# Patient Record
(demographics unavailable — no encounter records)

---

## 2024-10-15 NOTE — HEALTH RISK ASSESSMENT

## 2024-10-15 NOTE — HISTORY OF PRESENT ILLNESS
[FreeTextEntry8] : JOHANNE BOURNE is a 41 year old male presenting to establish care.  PMH of migraines, cavernous angioma

## 2024-10-23 NOTE — REASON FOR VISIT
[Home] : at home, [unfilled] , at the time of the visit. [Medical Office: ([facility])___] : at the medical office located in  [Verbal consent obtained from patient] : the patient, [unfilled] [New Patient Visit] : a new patient visit

## 2024-10-23 NOTE — REASON FOR VISIT
[Home] : at home, [unfilled] , at the time of the visit. [Medical Office: ([facility])___] : at the medical office located in  [Verbal consent obtained from patient] : the patient, [unfilled] [New Patient Visit] : a new patient visit

## 2024-10-30 NOTE — HISTORY OF PRESENT ILLNESS
[de-identified] : JOHANNE BOURNE is a 41 year old right hand dominant male, previous patient of Dr. Zhou being followed for cavernous malformation. He previously saw Dr. Lamb years ago. It was diagnosed [id number] after presenting with migraines, work-up by neurologist at the time Dr. Ajit Dueñas [phone]. Being treated with Nurtec for migraines. Questionable history of prior hemorrhage but not evident on 2018 MRI brain or new MRI brain.  Today, patient presents to establish neurosurgical care. He underwent most recent MRI of the brain w/wo IV contrast on 10/7/24.

## 2024-10-30 NOTE — ASSESSMENT
[FreeTextEntry1] : IMPRESSION: 41M RHD, PMH of migraines on Nurtec, previous patient of Dr. Zhou being followed for cavernous malformation diagnosed 2013 after p/w migraines, unchanged over many follow up studies.   Patient presents for phone call today to establish neurosurgical care. He underwent most recent MRI of the brain w/wo IV contrast on 10/7/24 which shows stable 8 x 9 x 7mm right thalamic cavernous angioma. Counseled patient on the natural history and pathophysiology of cavernous malformations. Risk of rupture is approximately 0.5% per year. Will continue to monitor.   PLAN: Recommend continued conservative management with repeat MRI brain w/wo contrast in 1 year - October 2025 Follow up after for review No neurosurgical contraindication to Nurtec or other medication for migraines

## 2024-10-30 NOTE — HISTORY OF PRESENT ILLNESS
[de-identified] : JOHANNE BOURNE is a 41 year old right hand dominant male, previous patient of Dr. Zhou being followed for cavernous malformation. He previously saw Dr. Lamb years ago. It was diagnosed [id number] after presenting with migraines, work-up by neurologist at the time Dr. Ajit Dueñas [phone]. Being treated with Nurtec for migraines. Questionable history of prior hemorrhage but not evident on 2018 MRI brain or new MRI brain.  Today, patient presents to establish neurosurgical care. He underwent most recent MRI of the brain w/wo IV contrast on 10/7/24.

## 2024-10-30 NOTE — DATA REVIEWED
[de-identified] : MRI brain w/wo IV contrast 10/7/24, 10/3/23, 10/12/18, 5/13/15 [de-identified] : MRA Brain non con 9/22/23

## 2024-10-30 NOTE — HISTORY OF PRESENT ILLNESS
[de-identified] : JHOANNE BOURNE is a 41 year old right hand dominant male, previous patient of Dr. Zhou being followed for cavernous malformation. He previously saw Dr. Lamb years ago. It was diagnosed [id number] after presenting with migraines, work-up by neurologist at the time Dr. Ajit Dueñas [phone]. Being treated with Nurtec for migraines. Questionable history of prior hemorrhage but not evident on 2018 MRI brain or new MRI brain.  Today, patient presents to establish neurosurgical care. He underwent most recent MRI of the brain w/wo IV contrast on 10/7/24.

## 2024-10-30 NOTE — DATA REVIEWED
[de-identified] : MRI brain w/wo IV contrast 10/7/24, 10/3/23, 10/12/18, 5/13/15 [de-identified] : MRA Brain non con 9/22/23

## 2024-10-30 NOTE — DATA REVIEWED
[de-identified] : MRI brain w/wo IV contrast 10/7/24, 10/3/23, 10/12/18, 5/13/15 [de-identified] : MRA Brain non con 9/22/23

## 2025-01-07 NOTE — PHYSICAL EXAM
[General Appearance - Alert] : alert [General Appearance - In No Acute Distress] : in no acute distress [Oriented To Time, Place, And Person] : oriented to person, place, and time [Impaired Insight] : insight and judgment were intact [Affect] : the affect was normal [Person] : oriented to person [Place] : oriented to place [Time] : oriented to time [Fluency] : fluency intact [Comprehension] : comprehension intact [Past History] : adequate knowledge of personal past history [Cranial Nerves Optic (II)] : visual acuity intact bilaterally,  visual fields full to confrontation, pupils equal round and reactive to light [Cranial Nerves Oculomotor (III)] : extraocular motion intact [Cranial Nerves Trigeminal (V)] : facial sensation intact symmetrically [Cranial Nerves Facial (VII)] : face symmetrical [Cranial Nerves Vestibulocochlear (VIII)] : hearing was intact bilaterally [Cranial Nerves Glossopharyngeal (IX)] : tongue and palate midline [Cranial Nerves Accessory (XI - Cranial And Spinal)] : head turning and shoulder shrug symmetric [Cranial Nerves Hypoglossal (XII)] : there was no tongue deviation with protrusion [Motor Tone] : muscle tone was normal in all four extremities [Motor Strength] : muscle strength was normal in all four extremities [No Muscle Atrophy] : normal bulk in all four extremities [Motor Handedness Right-Handed] : the patient is right hand dominant [Sensation Tactile Decrease] : light touch was intact [Abnormal Walk] : normal gait [Balance] : balance was intact [Past-pointing] : there was no past-pointing [Tremor] : no tremor present [Dysdiadochokinesia Bilaterally] : not present [Coordination - Dysmetria Impaired Finger-to-Nose Bilateral] : not present [2+] : Ankle jerk left 2+

## 2025-01-07 NOTE — DATA REVIEWED
[de-identified] :  Results:	   MR Head w/wo IV Cont             Final  No Documents Attached    	 EXAM: [id number] - MR BRAIN WAW IC  - ORDERED BY: LUPIS VALENCIA   PROCEDURE DATE:  10/07/2024    INTERPRETATION:  CLINICAL INDICATION: RIGHT THALAMIC CAVERNOUS MALFORMATION.  TECHNIQUE: Multi-planar, multi-sequence magnetic resonance imaging of the brain was performed with and without intravenous contrast.  CONTRAST: Post-contrast MR images were obtained following infusion of 9 mL of Gadavist  COMPARISON: 10/03/2023  FINDINGS:  VENTRICLES AND SULCI:  Normal. INTRA-AXIAL:  No acute intracranial hemorrhage, midline shift or evidence of acute cerebral ischemia. :  Redemonstration of lesion with increased signal on T2/FLAIR and a hemosiderin rim in the right thalamus measuring 0.8 x 0.9 x 0.7 cm which demonstrates susceptibility artifact. No abnormal enhancement. EXTRA-AXIAL:  No mass or collection. VISUALIZED SINUSES Retention cyst or polyp in the left maxillary sinus. VISUALIZED MASTOIDS:  Clear. CALVARIUM:  Normal. CAROTID FLOW VOIDS: Normal. 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  IMPRESSION: Stable right thalamic cavernous angioma.     	 EXAM: [id number] - MR ANGIO BRAIN  - ORDERED BY: CHERELLE YEE   PROCEDURE DATE:  09/22/2023    INTERPRETATION:  CLINICAL STATEMENT: Right thalamic cavernous angioma described on MRI of the brain 10/12/2018.  TECHNIQUE: MRA the [place] was performed utilizing 3-D time-of-flight technique. MIP images provided. COMPARISON: Correlation with MRI brain 10/12/2018.  FINDINGS:  Bilateral distal vertebral arteries patent, with left-sided dominance. Fetal origin right posterior cerebral artery.  No evidence of large vessel occlusion, or definitive aneurysm or vascular malformation.  Although suboptimally evaluated-characterized on this examination, again seen are findings previously described as cavernous angioma right anterior thalamic region, measuring up to approximately 1.0 x 0.9 cm in maximal diameter, not significantly changed given differences in technique. No definitive developmental venous anomaly visualized.  IMPRESSION: 1. No evidence of large vessel occlusion, definitive aneurysm or vascular malformation. 2. Stable (given differences in technique) 1.0 x 0.9 cm cavernous angioma right anterior thalamic region. Dedicated MRI of the brain would be of value for more accurate characterization-surveillance.  --- End of Report ---

## 2025-01-07 NOTE — DISCUSSION/SUMMARY
[FreeTextEntry1] : 41-year-old man right-handed with a history of chronic migraines, all preventative therapy.  Headaches are manageable with therapy with triptan although better with Nurtec 75 mg.  History of intracranial cavernoma, right thalamus, unchanged for least 10 years.  So about being watched with serial MRIs.  Recently seen by neurosurgery, no intervention planned. Reviewed and discussed recent imaging, compared to previous imaging available in the system to 2013. I will plan for a repeat MRI sometime at the end of this year. Reviewed and discussed the treatment of migraine headache, trigger management. Plan: Continue Nurtec 75 mg p.o. daily as needed, 90-day supply. Will have also make available naratriptan, 2.5 mg p.o. daily as needed headache, can repeat within 2 hours.  Maximum dose of 5 mg/day. Advised to maintain headache diary. If any increasing frequency of headache, or change in character advised to let me know. Return to office, 6 to 9 months.

## 2025-01-07 NOTE — HISTORY OF PRESENT ILLNESS
[FreeTextEntry1] : 41-year-old man right-handed with a history of migraine headaches, intracranial cavernoma here for follow-up visit, last time seen in the office in 2018.  Since then has been seen by other neurologist, as well as neurosurgery for his cavernoma.  Last imaging was in October 2024 showed no significant change in the known right thalamic cavernoma. He reports intermittent headaches but twice a month sometimes more sometimes less stress, humidity changes more common trigger.  Marginal severe, throbbing headaches make him nauseous dizzy light and sound sensitive no associated numbness tingling loss of vision, weakness. In the past zolmitriptan was very helpful but made him tired and fatigued.  Maximum for several hours.  Nurtec 75 mg works the best usually within less than an hour his headache is resolved resolved without any side effects. No other medical issues or concerns.  No recent hospitalizations, no blackouts, no history of seizures.

## 2025-04-03 NOTE — HEALTH RISK ASSESSMENT
[0] : 1) Little interest or pleasure doing things: Not at all (0) [1] : 2) Feeling down, depressed, or hopeless for several days (1) [PHQ-2 Negative - No further assessment needed] : PHQ-2 Negative - No further assessment needed [[id number]] : 1

## 2025-04-03 NOTE — HEALTH RISK ASSESSMENT
[0] : 1) Little interest or pleasure doing things: Not at all (0) [1] : 2) Feeling down, depressed, or hopeless for several days (1) [PHQ-2 Negative - No further assessment needed] : PHQ-2 Negative - No further assessment needed [[id number]] : 1